# Patient Record
Sex: MALE | Race: WHITE | NOT HISPANIC OR LATINO | Employment: UNEMPLOYED | URBAN - METROPOLITAN AREA
[De-identification: names, ages, dates, MRNs, and addresses within clinical notes are randomized per-mention and may not be internally consistent; named-entity substitution may affect disease eponyms.]

---

## 2017-10-02 ENCOUNTER — OFFICE VISIT (OUTPATIENT)
Dept: OPHTHALMOLOGY | Facility: CLINIC | Age: 22
End: 2017-10-02
Attending: OPHTHALMOLOGY
Payer: COMMERCIAL

## 2017-10-02 DIAGNOSIS — H52.03 HYPEROPIA OF BOTH EYES WITH ASTIGMATISM: ICD-10-CM

## 2017-10-02 DIAGNOSIS — H50.52 EXOPHORIA: Primary | ICD-10-CM

## 2017-10-02 DIAGNOSIS — H52.203 HYPEROPIA OF BOTH EYES WITH ASTIGMATISM: ICD-10-CM

## 2017-10-02 DIAGNOSIS — Q12.0 CONGENITAL CATARACT OF BOTH EYES: ICD-10-CM

## 2017-10-02 PROCEDURE — 99213 OFFICE O/P EST LOW 20 MIN: CPT | Mod: ZF

## 2017-10-02 PROCEDURE — 92015 DETERMINE REFRACTIVE STATE: CPT | Mod: GY,ZF

## 2017-10-02 ASSESSMENT — CUP TO DISC RATIO
OS_RATIO: 0.15
OD_RATIO: 0.15

## 2017-10-02 ASSESSMENT — VISUAL ACUITY
OS_SC+: +2
OS_SC: 20/40
CORRECTION_TYPE: GLASSES
OD_SC+: +3
METHOD: SNELLEN - LINEAR
OD_SC: 20/40

## 2017-10-02 ASSESSMENT — TONOMETRY
OD_IOP_MMHG: 16
IOP_METHOD: ICARE - SINGLE/KS
OS_IOP_MMHG: 14

## 2017-10-02 ASSESSMENT — SLIT LAMP EXAM - LIDS
COMMENTS: NORMAL
COMMENTS: NORMAL

## 2017-10-02 ASSESSMENT — REFRACTION_WEARINGRX
OD_CYLINDER: +0.75
OS_CYLINDER: +0.75
OD_AXIS: 010
OS_SPHERE: +5.50
OD_SPHERE: +5.50
OS_AXIS: 140

## 2017-10-02 ASSESSMENT — REFRACTION
OD_SPHERE: +5.50
OD_CYLINDER: +0.50
OS_AXIS: 140
OS_CYLINDER: +0.75
OS_SPHERE: +5.50
OD_AXIS: 010

## 2017-10-02 ASSESSMENT — CONF VISUAL FIELD
OD_NORMAL: 1
OS_NORMAL: 1
METHOD: TOYS

## 2017-10-02 ASSESSMENT — EXTERNAL EXAM - RIGHT EYE: OD_EXAM: NORMAL

## 2017-10-02 ASSESSMENT — EXTERNAL EXAM - LEFT EYE: OS_EXAM: NORMAL

## 2017-10-02 NOTE — MR AVS SNAPSHOT
After Visit Summary   10/2/2017    Carter Guaman    MRN: [id number]           Patient Information     Date Of Birth          1995        Visit Information        Provider Department      10/2/2017 10:10 AM Gabe Weiss MD [facility] Peds Eye General        Today's Diagnoses     Exophoria    -  1    Congenital cataract of both eyes        Hyperopia of both eyes with astigmatism          Care Instructions    To schedule an appointment in 1-2 years for your adult optometry appointment with Dr. Trinh or any of the adult optometrists: call the adult eye clinic  at [phone].           Follow-ups after your visit        Follow-up notes from your care team     Return in about 18 months (around 2019) for Adult Optometry.      Who to contact     Please call your clinic at [phone] to:    Ask questions about your health    Make or cancel appointments    Discuss your medicines    Learn about your test results    Speak to your doctor   If you have compliments or concerns about an experience at your clinic, or if you wish to file a complaint, please contact [facility] Patient Relations at [phone] or email us at [email or web address]         Additional Information About Your Visit        MyChart Information     Onset Technology[employer] is an electronic gateway that provides easy, online access to your medical records. With Nebula, you can request a clinic appointment, read your test results, renew a prescription or communicate with your care team.     To sign up for Sosedit visit the website at [email or web address]   You will be asked to enter the access code listed below, as well as some personal information. Please follow the directions to create your username and password.     Your access code is: [email or web address]  Expires: 2017 11:43 AM     Your access code will  in 90 days. If you need help or a new code, please contact your [facility]  [place] [facility] or call [phone] for assistance.        Care EveryWhere ID     This is your Care EveryWhere ID. This could be used by other organizations to access your [place] medical records  [id number]         Blood Pressure from Last 3 Encounters:   03/09/15 117/70   02/13/15 125/67    Weight from Last 3 Encounters:   03/09/15 60 kg (132 lb 3.2 oz) (14 %)*   02/13/15 60.6 kg (133 lb 11.2 oz) (17 %)*     * Growth percentiles are based on [facility] 2-20 Years data.              Today, you had the following     No orders found for display       Primary Care Provider Office Phone # Fax #    Ketty Gomez [phone]       [facility] [address] [address]        Equal Access to Services     AUTUMN [facility] : Hadii aad ku hadasho Soomaali, waaxda luqadaha, qaybta kaalmada adeegyada, waxay celiain hayaan trav sahu . So [facility] [phone].    ATENCIÓN: Si habla español, tiene a ramirez disposición servicios gratuitos de asistencia lingüística. Hannah al [phone].    We comply with applicable federal civil rights laws and [place] laws. We do not discriminate on the basis of race, color, national origin, age, disability, sex, sexual orientation, or gender identity.            Thank you!     Thank you for choosing [facility]  for your care. Our goal is always to provide you with excellent care. Hearing back from our patients is one way we can continue to improve our services. Please take a few minutes to complete the written survey that you may receive in the mail after your visit with us. Thank you!             Your Updated Medication List - Protect others around you: Learn how to safely use, store and throw away your medicines at www.disposemymeds.org.          This list is accurate as of: 10/2/17 11:44 AM.  Always use your most recent med list.                   Brand Name Dispense Instructions for use Diagnosis    CLONIDINE HCL PO            GNP FLAXSEED PO           MIRALAX powder   Generic drug:  polyethylene glycol      Take 1 capful by mouth daily        OMEPRAZOLE PO      Take 40 mg by mouth daily Opened into food        SERTRALINE HCL PO      Take 100 mg by mouth daily

## 2017-10-02 NOTE — PROGRESS NOTES
Chief Complaints and History of Present Illnesses   Patient presents with     Down's Syndrome     No new VA concerns d/n, wears glasses full time. No strabismus. No redness/irritation/tearing.   Review of systems for the eyes was negative other than the pertinent positives and negatives noted in the HPI.  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 Observe.     Down syndrome  No evidence of Keratoconus, glaucoma, or visually significant cataract    Anomalous optic nerve appearance, both eyes  Stable mild gliosis of bilateral optic nerves, right eye > left eye, slightly crowded vascularity  Nerve appearance seems stable compared to prior photos    Hypermetropia, bilateral  Results of refraction discussed, provided updated Rx for glasses    Hypotonia  Related to Down syndrome        Return in about 18 months (around 4/2/2019) for Adult Optometry. Can return to our [facility] eye clinic as needed if exams are difficult but Carter cooperates well and has the rest of his care on the adult side so I graduated him to adult eye exams today.    Patient Instructions   To schedule an appointment in 1-2 years for your adult optometry appointment with Dr. Trinh or any of the adult optometrists: call the adult eye clinic  at [phone].       Visit Diagnoses & Orders    ICD-10-CM    1. Exophoria H50.52    2. Congenital cataract of both eyes Q12.0    3. Hyperopia of both eyes with astigmatism H52.03     H52.203       Attending Physician Attestation:  Complete documentation of historical and exam elements from today's encounter can be found in the full encounter summary report (not reduplicated in this progress note).  I personally obtained  the chief complaint(s) and history of present illness.  I confirmed and edited as necessary the review of systems, past medical/surgical history, family history, social history, and examination findings as documented by others; and I examined the patient myself.  I personally reviewed the relevant tests, images, and reports as documented above.  I formulated and edited as necessary the assessment and plan and discussed the findings and management plan with the patient and family. - Gabe Weiss Jr., MD

## 2017-10-02 NOTE — PATIENT INSTRUCTIONS
To schedule an appointment in 1-2 years for your adult optometry appointment with Dr. Trinh or any of the adult optometrists: call the adult eye clinic  at [phone].

## 2017-10-02 NOTE — NURSING NOTE
Chief Complaint   Patient presents with     Down's Syndrome     No new VA concerns d/n, wears glasses full time. No strabismus. No redness/irritation/tearing.     HPI    Symptoms:           Do you have eye pain now?:  No

## 2022-12-06 ENCOUNTER — OFFICE VISIT (OUTPATIENT)
Dept: OPTOMETRY | Facility: CLINIC | Age: 27
End: 2022-12-06
Payer: COMMERCIAL

## 2022-12-06 DIAGNOSIS — H52.223 REGULAR ASTIGMATISM OF BOTH EYES: ICD-10-CM

## 2022-12-06 DIAGNOSIS — H57.9 ITCHY EYES: ICD-10-CM

## 2022-12-06 DIAGNOSIS — Z01.01 ENCOUNTER FOR EXAMINATION OF EYES AND VISION WITH ABNORMAL FINDINGS: Primary | ICD-10-CM

## 2022-12-06 DIAGNOSIS — H52.03 HYPERMETROPIA, BILATERAL: ICD-10-CM

## 2022-12-06 DIAGNOSIS — Q12.0 CONGENITAL CATARACT OF BOTH EYES: ICD-10-CM

## 2022-12-06 PROCEDURE — 92004 COMPRE OPH EXAM NEW PT 1/>: CPT | Performed by: OPTOMETRIST

## 2022-12-06 PROCEDURE — 92015 DETERMINE REFRACTIVE STATE: CPT | Performed by: OPTOMETRIST

## 2022-12-06 RX ORDER — CLONIDINE HYDROCHLORIDE 0.2 MG/1
TABLET ORAL
COMMUNITY
Start: 2022-09-20

## 2022-12-06 ASSESSMENT — REFRACTION_MANIFEST
OD_SPHERE: +6.00
OS_SPHERE: +5.75
OS_AXIS: 140
OD_AXIS: 011
OD_CYLINDER: +0.50
OS_CYLINDER: +0.75

## 2022-12-06 ASSESSMENT — CONF VISUAL FIELD
OS_SUPERIOR_TEMPORAL_RESTRICTION: 0
OS_SUPERIOR_NASAL_RESTRICTION: 0
OD_INFERIOR_TEMPORAL_RESTRICTION: 0
OS_INFERIOR_NASAL_RESTRICTION: 0
OS_NORMAL: 1
OD_NORMAL: 1
OD_SUPERIOR_NASAL_RESTRICTION: 0
OS_INFERIOR_TEMPORAL_RESTRICTION: 0
OD_INFERIOR_NASAL_RESTRICTION: 0
OD_SUPERIOR_TEMPORAL_RESTRICTION: 0
METHOD: COUNTING FINGERS

## 2022-12-06 ASSESSMENT — REFRACTION_WEARINGRX
OD_SPHERE: +5.50
OS_AXIS: 140
OD_AXIS: 010
OS_CYLINDER: +0.75
OD_CYLINDER: +0.50
OS_SPHERE: +5.50
SPECS_TYPE: SVL

## 2022-12-06 ASSESSMENT — EXTERNAL EXAM - LEFT EYE: OS_EXAM: NORMAL

## 2022-12-06 ASSESSMENT — CUP TO DISC RATIO
OD_RATIO: 0.15
OS_RATIO: 0.15

## 2022-12-06 ASSESSMENT — VISUAL ACUITY
OD_CC+: -1
OS_CC+: -1
OS_CC: 20/40-2
OD_CC: 20/40-2
METHOD: SNELLEN - LINEAR
CORRECTION_TYPE: GLASSES
OD_CC: 20/70
OS_CC: 20/60

## 2022-12-06 ASSESSMENT — TONOMETRY
OS_IOP_MMHG: NTT
IOP_METHOD: BOTH EYES NORMAL BY PALPATION
OD_IOP_MMHG: NTT

## 2022-12-06 ASSESSMENT — SLIT LAMP EXAM - LIDS
COMMENTS: NORMAL
COMMENTS: NORMAL

## 2022-12-06 ASSESSMENT — EXTERNAL EXAM - RIGHT EYE: OD_EXAM: NORMAL

## 2022-12-06 NOTE — PATIENT INSTRUCTIONS
Begin ketotifen fumarate eyedrops twice daily as needed for itching of eyes.     Updated glasses prescription provided today. Optional to fill.     Return in 1 year for a comprehensive eye exam, or sooner if needed.      The effects of the dilating drops last for 4- 6 hours.  You will be more sensitive to light and vision will be blurry up close.  Mydriatic sunglasses were given if needed.     Eliecer Laws, JESI  [address]  [place]  [address] [phone]

## 2022-12-06 NOTE — PROGRESS NOTES
Chief Complaint   Patient presents with     Annual Eye Exam      Accompanied by mother, Karol    -Has been having some issues with eye rubbing - for the past 4-6 weeks or so, mom reports that he has been rubbing his eyes and around his nose almost constantly. Really irritating, itchy and red. Symptoms definitely worsen toward end of day, his eyes are bloodshot and swollen from rubbing. Has seen ENT and PCP and they think it could be a long covid symptom (Had covid 1/2022). Mom thinks it is related to his sinuses - when the symptoms began, it was much more nose related. Has been using Flonase and Saline rinse PRN. Has tried warm compresses which he tolerates but they don't seem to help. Has not tried any AT's     Last Eye Exam: 10/2/2017   Dilated Previously: Yes, side effects of dilation explained today - has some trouble with drops     What are you currently using to see?  Glasses - wears full time        Distance Vision Acuity: Satisfied with vision    Near Vision Acuity: Satisfied with vision while reading and using computer with glasses    Eye Comfort: sore and itchy - see above notes  Do you use eye drops? : No  Occupation or Hobbies: Work program [email or web address], [email or web address], Watching TV    Radha Ho          Medical, surgical and family histories reviewed and updated 12/6/2022.       OBJECTIVE: See Ophthalmology exam    ASSESSMENT:    ICD-10-CM    1. Encounter for examination of eyes and vision with abnormal findings  Z01.01       2. Congenital cataract of both eyes  Q12.0       3. Itchy eyes  R68.89       4. Hypermetropia, bilateral  H52.03       5. Regular astigmatism of both eyes  H52.223           PLAN:     Patient Instructions   Begin ketotifen fumarate eyedrops twice daily as needed for itching of eyes.     Updated glasses prescription provided today. Optional to fill.     Return in 1 year for a comprehensive eye exam, or sooner if needed.      The effects of the dilating drops last for 4- 6 hours.  You  will be more sensitive to light and vision will be blurry up close.  Mydriatic sunglasses were given if needed.     Eliecer Laws, [address]  [place]  [address] [phone]

## 2022-12-06 NOTE — LETTER
12/6/2022         RE: Carter Guaman  [address] [place] [address]        Dear Colleague,    Thank you for referring your patient, Carter Guaman, to the [facility]. Please see a copy of my visit note below.    Chief Complaint   Patient presents with     Annual Eye Exam      Accompanied by mother, Karol    -Has been having some issues with eye rubbing - for the past 4-6 weeks or so, mom reports that he has been rubbing his eyes and around his nose almost constantly. Really irritating, itchy and red. Symptoms definitely worsen toward end of day, his eyes are bloodshot and swollen from rubbing. Has seen ENT and PCP and they think it could be a long covid symptom (Had covid 1/2022). Mom thinks it is related to his sinuses - when the symptoms began, it was much more nose related. Has been using Flonase and Saline rinse PRN. Has tried warm compresses which he tolerates but they don't seem to help. Has not tried any AT's     Last Eye Exam: 10/2/2017   Dilated Previously: Yes, side effects of dilation explained today - has some trouble with drops     What are you currently using to see?  Glasses - wears full time        Distance Vision Acuity: Satisfied with vision    Near Vision Acuity: Satisfied with vision while reading and using computer with glasses    Eye Comfort: sore and itchy - see above notes  Do you use eye drops? : No  Occupation or Hobbies: Work program [employer], [employer], Watching TV    [facility]          Medical, surgical and family histories reviewed and updated 12/6/2022.       OBJECTIVE: See Ophthalmology exam    ASSESSMENT:    ICD-10-CM    1. Encounter for examination of eyes and vision with abnormal findings  Z01.01       2. Congenital cataract of both eyes  Q12.0       3. Itchy eyes  R68.89       4. Hypermetropia, bilateral  H52.03       5. Regular astigmatism of both eyes  H52.223           PLAN:     Patient Instructions   Begin ketotifen fumarate  eyedrops twice daily as needed for itching of eyes.     Updated glasses prescription provided today. Optional to fill.     Return in 1 year for a comprehensive eye exam, or sooner if needed.      The effects of the dilating drops last for 4- 6 hours.  You will be more sensitive to light and vision will be blurry up close.  Mydriatic sunglasses were given if needed.     Eliecer Laws, JESI  [address] [place]  [address]    [phone]            Again, thank you for allowing me to participate in the care of your patient.        Sincerely,        Eilecer Laws OD

## 2023-06-27 ENCOUNTER — TRANSFERRED RECORDS (OUTPATIENT)
Dept: HEALTH INFORMATION MANAGEMENT | Facility: CLINIC | Age: 28
End: 2023-06-27
Payer: COMMERCIAL

## 2023-06-28 ENCOUNTER — TRANSFERRED RECORDS (OUTPATIENT)
Dept: HEALTH INFORMATION MANAGEMENT | Facility: CLINIC | Age: 28
End: 2023-06-28
Payer: COMMERCIAL

## 2023-07-17 ENCOUNTER — MEDICAL CORRESPONDENCE (OUTPATIENT)
Dept: HEALTH INFORMATION MANAGEMENT | Facility: CLINIC | Age: 28
End: 2023-07-17
Payer: COMMERCIAL

## 2023-07-18 ENCOUNTER — TELEPHONE (OUTPATIENT)
Dept: NEUROSURGERY | Facility: CLINIC | Age: 28
End: 2023-07-18
Payer: COMMERCIAL

## 2023-07-18 ENCOUNTER — TRANSCRIBE ORDERS (OUTPATIENT)
Dept: OTHER | Age: 28
End: 2023-07-18

## 2023-07-18 DIAGNOSIS — G51.8 PAIN DUE TO NEUROPATHY OF FACIAL NERVE: ICD-10-CM

## 2023-07-18 DIAGNOSIS — G50.0 TRIGEMINAL NEURALGIA: Primary | ICD-10-CM

## 2023-07-18 NOTE — TELEPHONE ENCOUNTER
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

## 2023-07-19 NOTE — TELEPHONE ENCOUNTER
Records Requested     July 19, 2023 4:21 PM   [id number]   Facility  [facility]   Outcome 4:25pm Sent request for imaging to be pushed to PACS. -ALBERTA Rodrigez on 7/20/2023 at 8:14 AM Imaging resolved into PACS.-ALBERTA     Records Requested     July 19, 2023 4:21 PM   [id number]   Facility  [facility]   Outcome 4:26pm Sent request for imaging to be pushed to PACS. -ALBERTA Rodrigez on 7/20/2023 at 8:14 AM Imaging resolved into PACS. -ALBERTA     RECORDS RECEIVED FROM: Care Everywhere    REASON FOR VISIT: Trigeminal Neuralgia    Date of Appt: 7/21/23 9:00am    NOTES (FOR ALL VISITS) STATUS DETAILS   OFFICE NOTE from referring provider Care Everywhere 7/18/23 (Transcribed Orders), 6/27/23 Annie Waldron APRN CNP @ [facility]     DISCHARGE REPORT from the ER Care Everywhere 5/23/23 Dee Dee Farias MD @ [facility] ED     OPERATIVE REPORT Care Everywhere 4/4/23 Malvin Lugo MD @ [facility]  (STEALTH) RIGHT MAXILLARY ANTROSTOMY AND LEFT SPHENOIDOTOMY WITH STEALTH GUIDANCE     MEDICATION LIST Internal    IMAGING  (FOR ALL VISITS)     MRI (HEAD, NECK, SPINE) PACS   6/28/23 MR Brain   CT (HEAD, NECK, SPINE) PACS NM  5/23/23 CT Head  1/7/23 CT Sinus  1/7/23  CT Head

## 2023-07-21 ENCOUNTER — LAB (OUTPATIENT)
Dept: LAB | Facility: CLINIC | Age: 28
End: 2023-07-21
Payer: COMMERCIAL

## 2023-07-21 ENCOUNTER — OFFICE VISIT (OUTPATIENT)
Dept: NEUROSURGERY | Facility: CLINIC | Age: 28
End: 2023-07-21
Payer: COMMERCIAL

## 2023-07-21 ENCOUNTER — PRE VISIT (OUTPATIENT)
Dept: NEUROSURGERY | Facility: CLINIC | Age: 28
End: 2023-07-21

## 2023-07-21 VITALS
SYSTOLIC BLOOD PRESSURE: 115 MMHG | HEART RATE: 80 BPM | RESPIRATION RATE: 16 BRPM | OXYGEN SATURATION: 98 % | DIASTOLIC BLOOD PRESSURE: 68 MMHG

## 2023-07-21 DIAGNOSIS — R51.9 FACIAL PAIN: ICD-10-CM

## 2023-07-21 DIAGNOSIS — R51.9 FACIAL PAIN: Primary | ICD-10-CM

## 2023-07-21 LAB
ANION GAP SERPL CALCULATED.3IONS-SCNC: 7 MMOL/L (ref 7–15)
BASOPHILS # BLD AUTO: 0.1 10E3/UL (ref 0–0.2)
BASOPHILS NFR BLD AUTO: 1 %
BUN SERPL-MCNC: 22 MG/DL (ref 6–20)
CALCIUM SERPL-MCNC: 9.6 MG/DL (ref 8.6–10)
CHLORIDE SERPL-SCNC: 106 MMOL/L (ref 98–107)
CREAT SERPL-MCNC: 1.23 MG/DL (ref 0.67–1.17)
DEPRECATED HCO3 PLAS-SCNC: 30 MMOL/L (ref 22–29)
EOSINOPHIL # BLD AUTO: 0 10E3/UL (ref 0–0.7)
EOSINOPHIL NFR BLD AUTO: 1 %
ERYTHROCYTE [DISTWIDTH] IN BLOOD BY AUTOMATED COUNT: 13.1 % (ref 10–15)
GFR SERPL CREATININE-BSD FRML MDRD: 82 ML/MIN/1.73M2
GLUCOSE SERPL-MCNC: 58 MG/DL (ref 70–99)
HCT VFR BLD AUTO: 49.3 % (ref 40–53)
HGB BLD-MCNC: 16.2 G/DL (ref 13.3–17.7)
IMM GRANULOCYTES # BLD: 0 10E3/UL
IMM GRANULOCYTES NFR BLD: 0 %
LYMPHOCYTES # BLD AUTO: 0.8 10E3/UL (ref 0.8–5.3)
LYMPHOCYTES NFR BLD AUTO: 18 %
MCH RBC QN AUTO: 31.8 PG (ref 26.5–33)
MCHC RBC AUTO-ENTMCNC: 32.9 G/DL (ref 31.5–36.5)
MCV RBC AUTO: 97 FL (ref 78–100)
MONOCYTES # BLD AUTO: 0.3 10E3/UL (ref 0–1.3)
MONOCYTES NFR BLD AUTO: 6 %
NEUTROPHILS # BLD AUTO: 3.3 10E3/UL (ref 1.6–8.3)
NEUTROPHILS NFR BLD AUTO: 74 %
NRBC # BLD AUTO: 0 10E3/UL
NRBC BLD AUTO-RTO: 0 /100
PLATELET # BLD AUTO: 213 10E3/UL (ref 150–450)
POTASSIUM SERPL-SCNC: 4.7 MMOL/L (ref 3.4–5.3)
RBC # BLD AUTO: 5.09 10E6/UL (ref 4.4–5.9)
SODIUM SERPL-SCNC: 143 MMOL/L (ref 136–145)
WBC # BLD AUTO: 4.4 10E3/UL (ref 4–11)

## 2023-07-21 PROCEDURE — 36415 COLL VENOUS BLD VENIPUNCTURE: CPT | Performed by: PATHOLOGY

## 2023-07-21 PROCEDURE — 80048 BASIC METABOLIC PNL TOTAL CA: CPT | Performed by: PATHOLOGY

## 2023-07-21 PROCEDURE — 99204 OFFICE O/P NEW MOD 45 MIN: CPT | Performed by: NURSE PRACTITIONER

## 2023-07-21 PROCEDURE — 85025 COMPLETE CBC W/AUTO DIFF WBC: CPT | Performed by: PATHOLOGY

## 2023-07-21 RX ORDER — OXCARBAZEPINE 60 MG/ML
60 SUSPENSION ORAL 2 TIMES DAILY
Qty: 100 ML | Refills: 1 | Status: SHIPPED | OUTPATIENT
Start: 2023-07-21 | End: 2023-08-14

## 2023-07-21 ASSESSMENT — PATIENT HEALTH QUESTIONNAIRE - PHQ9: SUM OF ALL RESPONSES TO PHQ QUESTIONS 1-9: 8

## 2023-07-21 NOTE — NURSING NOTE
Chief Complaint   Patient presents with     New Patient     New patient, confirmed with patient.     Minda Navarro

## 2023-07-21 NOTE — PATIENT INSTRUCTIONS
We discussed Oxcarbazepine/ Carbamazepine as medication options. Will call you after I talk to the pharmacist.     Labs today.

## 2023-07-21 NOTE — PROGRESS NOTES
[facility]  Department of Neurosurgery      Name: Carter Guaman  MRN: [id number]  Age: 28 year old  : 1995  Referring provider: Annie Waldron  2023      Chief Complaint:   Right Facial pain  New patient    History of Present Illness: Carter Guaman is a 28 year old male with a history of Down syndrome who is here today for right sided facial pain. He is accompanied by his parents. History provided by parents. In 2022, patient had covid. Per parents,  Since this infection he started to rub the right side of his nose frequently and appeared uncomfortable. He also had frequent sinus infections and subsequently underwent sinus surgery (Right maxillary antrostomy/right uncinectomy with tissue removal and Left sphenoidotomy with tissue removal) in 2023.     Patient continued to report discomfort and was evaluated by a Neurology provider. Trigeminal neuralgia was brought up as a possible diagnosis. Patient was started on Gabapentin and later on Lamotrigine. Patient had blisters/ rash from these medications.     Today, parents report that patient continues to show abnormal behaviors such as rubbing on the right side of his nose, holding his forehead, pounding on his thighs as ways to express pain. He has been eating and drinking normally. Parents have been medicating him with Tylenol and Ibuprofen, but doesn't seem to help with his symptoms.     He had a right sided nerve block at [facility]. Per mom, due to the right sided facial numbness and discomfort post nerve block, patient started to bite his cheeks and had bad injury to his cheeks. This took many weeks to completely heal.      Patient takes clonidine, medical cannabis, and Zoloft.       Review of Systems:   Pertinent items are noted in HPI or as in patient entered ROS below, remainder of complete ROS is negative.        No data to display                 Active Medications:     Current Outpatient Medications:     cloNIDine  (CATAPRES) 0.2 MG tablet, , Disp: , Rfl:     CLONIDINE HCL PO, , Disp: , Rfl:     sertraline (ZOLOFT) 50 MG tablet, , Disp: , Rfl:     ketotifen (ZADITOR) 0.025 % ophthalmic solution, Place 1 drop into both eyes 2 times daily as needed for itching (Patient not taking: Reported on 7/21/2023), Disp: 5 mL, Rfl: 11    SERTRALINE HCL PO, Take 100 mg by mouth daily (Patient not taking: Reported on 7/21/2023), Disp: , Rfl:       Allergies:   Patient has no known allergies.      Past Medical History:  Past Medical History:   Diagnosis Date    Down syndrome     in transitions program; doing well, in vitor achievement, does sanding and painting of retail boards, liviing skills    Hyperopia     wears glasses FT; father notes no ET, no diplopia, no headaches/eyestrain    Hypotonia     no problems with joints, no braces; no pain     Patient Active Problem List   Diagnosis    Exophoria    Hypotonia    Down syndrome    Hypermetropia    Lens opacities    Food aversion    Scoliosis    Depression    Constipation        Past Surgical History:  Past Surgical History:   Procedure Laterality Date    DENTAL SURGERY      PE TUBES      TONSILLECTOMY         Family History:   Family History   Problem Relation Age of Onset    Cancer Maternal Grandmother     Diabetes Other         Maternal uncle    Amblyopia No family hx of     Strabismus No family hx of     Glaucoma No family hx of     Hypertension No family hx of     Macular Degeneration No family hx of          Social History:   Social History     Tobacco Use    Smoking status: Never   Substance Use Topics    Alcohol use: No        Physical Exam:   General: No acute distress.   Neuro: Patient is awake.  Verbal capacity is limited due to Down syndrome.  Expresses discomfort and pain by intermittently rubbing on the right side of his nose/ face and holding onto his right forehead. Gait is normal with normal heel-toe walking.   Psych: Normal mood and affect. Behavior is normal.       Imagin2023 MRI Brain:  1. No acute intracranial abnormality evident.   2. No intracranial mass or mass effect. No abnormal intracranial enhancement. No focal parenchymal signal abnormality. No hydrocephalus.   3. No abnormal enhancement along the trigeminal nerves. No abnormality in the expected location of the trigeminal nuclei. Vascular loop contacting the medial surface of the cisternal segment of the right trigeminal nerve.        Assessment:  Right Facial pain  New patient    Plan:  Today we discussed trigeminal neuralgia, medical and surgical management.  This is usually a clinical diagnosis based on patient's symptoms.  Main challenge is that patient has limited verbal capacity secondary to Down syndrome.  He is demonstrating discomfort/pain by nonverbal cues.  He had allergy (blisters/rash) to gabapentin and lamotrigine.  Discussed a trial of oxcarbazepine.  Patient tolerates liquid medication better. There is an increased risk of rash with this medication.  Discussed with the pharmacist who recommended to start the patient on 60 mg twice daily.  Prescription sent to pharmacy.  He is on Zoloft which increases the risk for serotonin syndrome.  Recommended to reduce the dose after consulting with his primary care physician.    His MRI brain will be reviewed by our team and patient's mother will be contacted with additional recommendations.    Addendum on 2023: Imaging was reviewed by Dr. Umana.  MRI of the brain does not show any major vascular compression on the right side.  Patient is not a candidate for MVD.  Due to the prior history of trigeminal trophic syndrome after nerve block patient is not a candidate for surgical ablation.  We could consider Cymbalta trial and referral to facial pain clinic.         Cyndi Borges CNP  Department of Neurosurgery    I spent 45 minutes on patient care activities related to this encounter on the date of service, including time spent reviewing the  chart, obtaining history and examination and in counseling the patient, and in documentation in the electronic medical record.

## 2023-07-21 NOTE — LETTER
2023       RE: Carter Guaman  [address] [place] [address]     Dear Colleague,    Thank you for referring your patient, Carter Guaman, to the [facility] NEUROSURGERY CLINIC [place] at [facility]. Please see a copy of my visit note below.    [facility]  Department of Neurosurgery      Name: Carter Guaman  MRN: [id number]  Age: 28 year old  : 1995  Referring provider: Annie Waldron  2023      Chief Complaint:   Right Facial pain  New patient    History of Present Illness: Carter Guaman is a 28 year old male with a history of Down syndrome who is here today for right sided facial pain. He is accompanied by his parents. History provided by parents. In 2022, patient had covid. Per parents,  Since this infection he started to rub the right side of his nose frequently and appeared uncomfortable. He also had frequent sinus infections and subsequently underwent sinus surgery (Right maxillary antrostomy/right uncinectomy with tissue removal and Left sphenoidotomy with tissue removal) in 2023.     Patient continued to report discomfort and was evaluated by a Neurology provider. Trigeminal neuralgia was brought up as a possible diagnosis. Patient was started on Gabapentin and later on Lamotrigine. Patient had blisters/ rash from these medications.     Today, parents report that patient continues to show abnormal behaviors such as rubbing on the right side of his nose, holding his forehead, pounding on his thighs as ways to express pain. He has been eating and drinking normally. Parents have been medicating him with Tylenol and Ibuprofen, but doesn't seem to help with his symptoms.     He had a right sided nerve block at [facility] pain clinic. Per mom, due to the right sided facial numbness and discomfort post nerve block, patient started to bite his cheeks and had bad injury to his cheeks. This took many weeks to  completely heal.      Patient takes clonidine, medical cannabis, and Zoloft.       Review of Systems:   Pertinent items are noted in HPI or as in patient entered ROS below, remainder of complete ROS is negative.        No data to display                 Active Medications:     Current Outpatient Medications:     cloNIDine (CATAPRES) 0.2 MG tablet, , Disp: , Rfl:     CLONIDINE HCL PO, , Disp: , Rfl:     sertraline (ZOLOFT) 50 MG tablet, , Disp: , Rfl:     ketotifen (ZADITOR) 0.025 % ophthalmic solution, Place 1 drop into both eyes 2 times daily as needed for itching (Patient not taking: Reported on 7/21/2023), Disp: 5 mL, Rfl: 11    SERTRALINE HCL PO, Take 100 mg by mouth daily (Patient not taking: Reported on 7/21/2023), Disp: , Rfl:       Allergies:   Patient has no known allergies.      Past Medical History:  Past Medical History:   Diagnosis Date    Down syndrome     in transitions program; doing well, in vitor achievement, does sanding and painting of retail boards, liviing skills    Hyperopia     wears glasses FT; father notes no ET, no diplopia, no headaches/eyestrain    Hypotonia     no problems with joints, no braces; no pain     Patient Active Problem List   Diagnosis    Exophoria    Hypotonia    Down syndrome    Hypermetropia    Lens opacities    Food aversion    Scoliosis    Depression    Constipation        Past Surgical History:  Past Surgical History:   Procedure Laterality Date    DENTAL SURGERY      PE TUBES      TONSILLECTOMY         Family History:   Family History   Problem Relation Age of Onset    Cancer Maternal Grandmother     Diabetes Other         Maternal uncle    Amblyopia No family hx of     Strabismus No family hx of     Glaucoma No family hx of     Hypertension No family hx of     Macular Degeneration No family hx of          Social History:   Social History     Tobacco Use    Smoking status: Never   Substance Use Topics    Alcohol use: No        Physical Exam:   General: No acute  distress.   Neuro: Patient is awake.  Verbal capacity is limited due to Down syndrome.  Expresses discomfort and pain by intermittently rubbing on the right side of his nose/ face and holding onto his right forehead. Gait is normal with normal heel-toe walking.   Psych: Normal mood and affect. Behavior is normal.      Imagin2023 MRI Brain:  1. No acute intracranial abnormality evident.   2. No intracranial mass or mass effect. No abnormal intracranial enhancement. No focal parenchymal signal abnormality. No hydrocephalus.   3. No abnormal enhancement along the trigeminal nerves. No abnormality in the expected location of the trigeminal nuclei. Vascular loop contacting the medial surface of the cisternal segment of the right trigeminal nerve.        Assessment:  Right Facial pain  New patient    Plan:  Today we discussed trigeminal neuralgia, medical and surgical management.  This is usually a clinical diagnosis based on patient's symptoms.  Main challenge is that patient has limited verbal capacity secondary to Down syndrome.  He is demonstrating discomfort/pain by nonverbal cues.  He had allergy (blisters/rash) to gabapentin and lamotrigine.  Discussed a trial of oxcarbazepine.  Patient tolerates liquid medication better. There is an increased risk of rash with this medication.  Discussed with the pharmacist who recommended to start the patient on 60 mg twice daily.  Prescription sent to pharmacy.  He is on Zoloft which increases the risk for serotonin syndrome.  Recommended to reduce the dose after consulting with his primary care physician.    His MRI brain will be reviewed by our team and patient's mother will be contacted with additional recommendations.    I spent 45 minutes on patient care activities related to this encounter on the date of service, including time spent reviewing the chart, obtaining history and examination and in counseling the patient, and in documentation in the electronic medical  record.          Again, thank you for allowing me to participate in the care of your patient.      Sincerely,    TELLY Emery CNP

## 2023-07-24 ENCOUNTER — TELEPHONE (OUTPATIENT)
Dept: NEUROSURGERY | Facility: CLINIC | Age: 28
End: 2023-07-24
Payer: COMMERCIAL

## 2023-07-24 NOTE — TELEPHONE ENCOUNTER
Spoke with patient's mom. They started Oxcarbazepine 60 mg daily on Friday and is up to 60 mg twice daily for the past few days. No noticeable improvement in symptoms yet. I recommended to gradually increase the dose up to 5 ml (300 mg) twice daily.    We will review his brain MRI and will cob=ntact them with additional recommendations.

## 2023-07-24 NOTE — TELEPHONE ENCOUNTER
Voice mail received asking if medication trial started after OV with Cyndi Borges NP for facial pain should be continued.    OV 7/21/23.  ? Trigeminal Neuralgia, patient started on  Liquid Oxcarbazepine 60mg twice daily.    Left message for Mother to return call to Nupur OTERO @ [phone], normally at least 2 week trial to see if any facial  Pain relief. Patient Down's syndrome and verbal  Communication is difficult.

## 2023-07-27 ENCOUNTER — TELEPHONE (OUTPATIENT)
Dept: NEUROSURGERY | Facility: CLINIC | Age: 28
End: 2023-07-27
Payer: COMMERCIAL

## 2023-07-27 NOTE — TELEPHONE ENCOUNTER
"Dad calling with Carter symptoms.   Carter started Liquid Oxcarbazepime last Friday 7/21/23, now taking 2ml twice daily.  Carter complaining of really bad headache.  Patient keeps saying \"my head, my head\", wont put on head phones because head hurting.  Headache started today.   No difference in facial pain, in eyes and nose area, no improvement in facial pain.  Dad states will stop the medication, to see if headache goes away in 24 -48 hours.  If the headache goes away, it could be from the medication.  If headache does not go away it may be from something else.  Patient eating drinking as normal, no other issues.    Voices understanding  "

## 2023-07-31 ENCOUNTER — TELEPHONE (OUTPATIENT)
Dept: NEUROSURGERY | Facility: CLINIC | Age: 28
End: 2023-07-31
Payer: COMMERCIAL

## 2023-07-31 NOTE — TELEPHONE ENCOUNTER
----- Message from TELLY Emery CNP sent at 7/28/2023  4:12 PM CDT -----  Hi,    If his parents call won, we can recommend Cymbalta trial per AG. Then facial Pain clinic.     No clear neurovascular compression on MRI.     Thanks,  Cyndi

## 2023-08-14 DIAGNOSIS — R51.9 FACIAL PAIN: Primary | ICD-10-CM

## 2023-08-14 RX ORDER — OXCARBAZEPINE 60 MG/ML
300 SUSPENSION ORAL 2 TIMES DAILY
Qty: 300 ML | Refills: 0 | Status: SHIPPED | OUTPATIENT
Start: 2023-08-14 | End: 2023-09-14

## 2023-08-14 NOTE — TELEPHONE ENCOUNTER
Carter now taking Oxcarbazepime 5ml twice daily for facial rubbing and pain.  Mom does see some improvement in number of good days, some improvement with talking more often and some + behavior change.  Patient does still have some bad days, he is taking Ibuprofen for the headaches if needed.  Patient appears to be tolerating medication.   Mom asking for refill as started with 1ML twice daily.    Will review with Provider and order medication.  Voices understanding.

## 2023-09-08 ENCOUNTER — TELEPHONE (OUTPATIENT)
Dept: NEUROSURGERY | Facility: CLINIC | Age: 28
End: 2023-09-08
Payer: COMMERCIAL

## 2023-09-08 NOTE — TELEPHONE ENCOUNTER
Mother left message asking for a callback.    Callback to Mother, Karol, leaving a message will call back on Monday.

## 2023-09-11 ENCOUNTER — TELEPHONE (OUTPATIENT)
Dept: NEUROSURGERY | Facility: CLINIC | Age: 28
End: 2023-09-11
Payer: COMMERCIAL

## 2023-09-11 NOTE — TELEPHONE ENCOUNTER
Spoke with Mother, Carter is still having issues with facial pain, states having behavior problems at work, patient states issues, but can not elaborate, Down's syndrome.    Taking small amount of Oxcarbazepine liquid at this time, not sure if helping or not.  Appointment set for 9/12 w Cyndi Borges NP at 11AM to  discuss over the phone.      Voices understaniding

## 2023-09-12 ENCOUNTER — VIRTUAL VISIT (OUTPATIENT)
Dept: NEUROSURGERY | Facility: CLINIC | Age: 28
End: 2023-09-12
Payer: COMMERCIAL

## 2023-09-12 DIAGNOSIS — R51.9 FACIAL PAIN: Primary | ICD-10-CM

## 2023-09-12 PROCEDURE — 99213 OFFICE O/P EST LOW 20 MIN: CPT | Mod: 93 | Performed by: NURSE PRACTITIONER

## 2023-09-12 ASSESSMENT — PAIN SCALES - GENERAL: PAINLEVEL: WORST PAIN (10)

## 2023-09-12 NOTE — NURSING NOTE
Is the patient currently in the state of [place]? YES    Visit mode:TELEPHONE    If the visit is dropped, the patient can be reconnected by: VIDEO VISIT: Text to cell phone:   Telephone Information:   Mobile [phone]       Will anyone else be joining the visit? NO  (If patient encounters technical issues they should call [phone]    How would you like to obtain your AVS? MyChart    Are changes needed to the allergy or medication list? YES  Medical marijuana tablet form   Ibuprofen     Reason for visit: follow-up     Shawna JEWLEL

## 2023-09-12 NOTE — LETTER
2023       RE: Carter Guaman  [address] [place] [address]     Dear Colleague,    Thank you for referring your patient, Carter Guaman, to the [facility] NEUROSURGERY CLINIC [place] at [facility]. Please see a copy of my visit note below.    Virtual Visit Details    Type of service:  Telephone Visit   Phone call duration: 25 minutes     [facility]  Department of Neurosurgery      Name: Carter Guaman  MRN: [id number]  Age: 28 year old  : 1995  Referring provider: No ref. provider found  2023      Chief Complaint:   Right facial pain  Follow-up     History of Present Illness:    Carter Guaman is a 28 year old male with a history of Down syndrome who is here today for a follow-up for right sided facial pain. I had a phone visit with patient's mom, Karol.     In 2022, patient had covid. Per parents, since this infection he started to rub the right side of his nose frequently and appeared uncomfortable. He also had frequent sinus infections and subsequently underwent sinus surgery (Right maxillary antrostomy/right uncinectomy with tissue removal and Left sphenoidotomy with tissue removal) in 2023.      Patient continued to report discomfort and was evaluated by a Neurology provider. Trigeminal neuralgia was brought up as a possible diagnosis. Patient was started on Gabapentin and later on Lamotrigine. Patient had blisters/ rash from these medications.     Initial visit with me in 2023. Oxcarbazepine trial was recommended at that time and patient was started on this medication. Currently takes 300 mg twice daily. Per mom, it is unclear whether this medication is effective or not as patient is non verbal due to his Down syndrome. Per mom, immediately after starting this medication, patient was showing less cues of discomfort and they assumed the medication was working. But for the past few weeks, patient has been  "really struggling. He is more angry and \"constantly rubbing his right side of his nose\". Also had some trouble at his work place due to behavioral reasons. Due to his symptoms, his sleep has been affected. He eats and drink without any issues.     Per mom, she helps him with shower and while he is in the shower, he constantly tries to clear his nostrils/ sinuses. On a prior MRI, mild chronic inflammatory mucosal thickening in the right maxillary sinus was noted and she is wondering whether his facial pain is from that rather than possible trigeminal neuralgia.       Review of Systems:   Pertinent items are noted in HPI or as in patient entered ROS below, remainder of complete ROS is negative.        No data to display                 Imagin2023 MRI Brain:  1. No acute intracranial abnormality evident.   2. No intracranial mass or mass effect. No abnormal intracranial enhancement. No focal parenchymal signal abnormality. No hydrocephalus.   3. No abnormal enhancement along the trigeminal nerves. No abnormality in the expected location of the trigeminal nuclei. Vascular loop contacting the medial surface of the cisternal segment of the right trigeminal nerve.       Assessment:  Right facial pain  Follow-up     Plan:  28 year old male with right sided facial pain. Main challenge is that patient has limited verbal capacity secondary to Down syndrome.  Patient's mom is concerned about the chronic inflammatory mucosal thickening in the right maxillary sinus shown on a prior brain MRI and is wondering whether that could be the reason for his facial pain rather than possible trigeminal neuralgia. She would like to gradually reduce Oxcarbazepine dose to see whether there is significant worsening of his facial pain.     He was evaluated by ENT at [facility] and tells me that they are having a hard time to arrange a follow-up visit with his provider. She is intersted in establishing care with someone in " [facility] system. Will send a message a ENT clinic schedulers.        I spent 25 minutes on patient care activities related to this encounter on the date of service, including time spent reviewing the chart, obtaining history and examination and in counseling the patient, and in documentation in the electronic medical record.          Again, thank you for allowing me to participate in the care of your patient.      Sincerely,    TELLY Emery CNP

## 2023-09-12 NOTE — PROGRESS NOTES
"Virtual Visit Details    Type of service:  Telephone Visit   Phone call duration: 25 minutes     [facility]  Department of Neurosurgery      Name: Carter Guaman  MRN: [id number]  Age: 28 year old  : 1995  Referring provider: No ref. provider found  2023      Chief Complaint:   Right facial pain  Follow-up     History of Present Illness:    Carter Guaman is a 28 year old male with a history of Down syndrome who is here today for a follow-up for right sided facial pain. I had a phone visit with patient's mom, Karol.     In 2022, patient had covid. Per parents, since this infection he started to rub the right side of his nose frequently and appeared uncomfortable. He also had frequent sinus infections and subsequently underwent sinus surgery (Right maxillary antrostomy/right uncinectomy with tissue removal and Left sphenoidotomy with tissue removal) in 2023.      Patient continued to report discomfort and was evaluated by a Neurology provider. Trigeminal neuralgia was brought up as a possible diagnosis. Patient was started on Gabapentin and later on Lamotrigine. Patient had blisters/ rash from these medications.     Initial visit with me in 2023. Oxcarbazepine trial was recommended at that time and patient was started on this medication. Currently takes 300 mg twice daily. Per mom, it is unclear whether this medication is effective or not as patient is non verbal due to his Down syndrome. Per mom, immediately after starting this medication, patient was showing less cues of discomfort and they assumed the medication was working. But for the past few weeks, patient has been really struggling. He is more angry and \"constantly rubbing his right side of his nose\". Also had some trouble at his work place due to behavioral reasons. Due to his symptoms, his sleep has been affected. He eats and drink without any issues.     Per mom, she helps him with shower and while he is in the shower, he " constantly tries to clear his nostrils/ sinuses. On a prior MRI, mild chronic inflammatory mucosal thickening in the right maxillary sinus was noted and she is wondering whether his facial pain is from that rather than possible trigeminal neuralgia.       Review of Systems:   Pertinent items are noted in HPI or as in patient entered ROS below, remainder of complete ROS is negative.        No data to display                 Imagin2023 MRI Brain:  1. No acute intracranial abnormality evident.   2. No intracranial mass or mass effect. No abnormal intracranial enhancement. No focal parenchymal signal abnormality. No hydrocephalus.   3. No abnormal enhancement along the trigeminal nerves. No abnormality in the expected location of the trigeminal nuclei. Vascular loop contacting the medial surface of the cisternal segment of the right trigeminal nerve.       Assessment:  Right facial pain  Follow-up     Plan:  28 year old male with right sided facial pain. Main challenge is that patient has limited verbal capacity secondary to Down syndrome.  Patient's mom is concerned about the chronic inflammatory mucosal thickening in the right maxillary sinus shown on a prior brain MRI and is wondering whether that could be the reason for his facial pain rather than possible trigeminal neuralgia. She would like to gradually reduce Oxcarbazepine dose to see whether there is significant worsening of his facial pain.     He was evaluated by ENT at [facility] and tells me that they are having a hard time to arrange a follow-up visit with his provider. She is intersted in establishing care with someone in [facility] system. Will send a message a ENT clinic schedulers.     Addendum on : Message sent to ENT clinic coordinators for an appointment.        I spent 25 minutes on patient care activities related to this encounter on the date of service, including time spent reviewing the chart, obtaining history and examination  and in counseling the patient, and in documentation in the electronic medical record.      Cyndi VARNER CNP  Department of Neurosurgery

## 2023-09-14 ENCOUNTER — TELEPHONE (OUTPATIENT)
Dept: NEUROSURGERY | Facility: CLINIC | Age: 28
End: 2023-09-14
Payer: COMMERCIAL

## 2023-09-14 DIAGNOSIS — R51.9 FACIAL PAIN: ICD-10-CM

## 2023-09-14 RX ORDER — OXCARBAZEPINE 60 MG/ML
300 SUSPENSION ORAL 2 TIMES DAILY
Qty: 300 ML | Refills: 1 | Status: SHIPPED | OUTPATIENT
Start: 2023-09-14 | End: 2023-10-13

## 2023-09-14 NOTE — TELEPHONE ENCOUNTER
Spoke with Mother, Oxcarbazepine refilled,at Office Visit on 9/12/23 Mom will start to taper medication, taking liquid form to see if facial twinges improve, worsen or stay the same.      Cyndi was going to visit with ENT in Facial Pain to check on any recommendations .    Voices understanding.

## 2023-09-19 NOTE — TELEPHONE ENCOUNTER
FUTURE VISIT INFORMATION      FUTURE VISIT INFORMATION:  Date: 11/1/23  Time: 3:20pm  [place] [facility] [place]  REFERRAL INFORMATION:  Referring provider: Cyndi Borges APRN CNP   Referring providers clinic:  [facility]  Reason for visit/diagnosis  chronic inflammatory mucosal thickening in the right maxillary sinus shown on a prior brain MRI and frequent sinus infections     RECORDS REQUESTED FROM:       Clinic name Comments Records Status Imaging Status   [facility]  9/12/23, 7/21/23- ov Cyndi Borges APRN CNP  [facility]     Neurology [facility]  6/27/23- ov    Scanned in epic  Pacs    [facility]  6/28/23- mri brain   5/23/23- ct head   1/7/23- ct sinus, ct head     Office visit:  6/27/23- Ov Annie Waldron APRN, CNP    5/23/23- ED    Procedure:  4/4/23- Right Maxillary surgery  Care everywhere  Pacs    [facility]  12/21/22- CT sinus     Office Visit:  12/8/22- Ov Felix Feliciano NP   10/1/22- Ov Tayler Galvez NP    9/19/22- Ov Monserrat Lemus NP    7/14/22- Ov Bridget Ramirez NP [facility] 9/19/23-  Pending re    PACS     September 19, 2023 11:58 AM - Faxed a request to Melissa to push Image to [facility] PACS- Niesha   September 28, 2023 8:58 AM - Received image in pacs and attached it to patient- Niesha

## 2023-09-28 ENCOUNTER — TELEPHONE (OUTPATIENT)
Dept: NEUROSURGERY | Facility: CLINIC | Age: 28
End: 2023-09-28
Payer: COMMERCIAL

## 2023-09-28 NOTE — TELEPHONE ENCOUNTER
Mother states patient having horrible days with facial pain. Patient back on 5ML Oxcarbazepine and still complaining of eye pains, both sides hard to determine which side is worse.  Patient had appointment with ENT, nothing found to cause facial pain.  Mom states when lowered Oxcarbazepine the patient complained of more pain so when back up to 5ML.    Lab work:  Oxcarbazepine level at   OXCARBAZEPINE (MEDTOX) 7.6 Low        Therapeutic range starts at 10.      Mother just does not know what to do with the facial pain that patient complains of.    Will refer to Cyndi Borges NP and get back to patient.

## 2023-10-12 ENCOUNTER — TELEPHONE (OUTPATIENT)
Dept: NEUROSURGERY | Facility: CLINIC | Age: 28
End: 2023-10-12
Payer: COMMERCIAL

## 2023-10-12 NOTE — TELEPHONE ENCOUNTER
"Spoke with Mom, Mom states patient doing \"awful\", states his affect is changing with what appears facial pain  Mom states pain remains on bridge of nose area and both eyes.  No improvement on Oxcarbazepine 300mg/5mg suspension twice daily.  Explained Oxcarbazepine level was low, can increase medication if Na level is ok.    Fax BMP to [facility] in [place]     Mom states patient has been vomiting at times, not eating as well.    Mom is very worried she is \"losing him slowly\"    Out bound call to [place] [facility], making sure fax received for BMP.  After BMP can increase Oxcarb medication.if Na is ok.      Outbound call to Mom, Fax received at [place] for BMP.  Please call for lab  appointment .  Mom states will try for appointment today or tomorrow.    Voices understanding, will watch for lab results.    "

## 2023-10-13 ENCOUNTER — TELEPHONE (OUTPATIENT)
Dept: NEUROSURGERY | Facility: CLINIC | Age: 28
End: 2023-10-13
Payer: COMMERCIAL

## 2023-10-13 DIAGNOSIS — R51.9 FACIAL PAIN: ICD-10-CM

## 2023-10-13 RX ORDER — OXCARBAZEPINE 60 MG/ML
600 SUSPENSION ORAL 2 TIMES DAILY
Qty: 600 ML | Refills: 1 | Status: SHIPPED | OUTPATIENT
Start: 2023-10-13 | End: 2024-01-29

## 2023-10-13 NOTE — TELEPHONE ENCOUNTER
Spoke with MOM, BMP completed,  Na 142 high normal  Carbazepine Level completed and below normal.    Patient continues to complain of eyes face hurting. Oxcarbazepine increased from 5ml to 10ml liquid twice daily per Cyndi Borges NP    Facial pain clinic appointment set for 11/20/23 @ 345.  Mom will callback in 5 days or so with update on patient after increasing medication.    Voices understanding.

## 2023-10-20 ENCOUNTER — TELEPHONE (OUTPATIENT)
Dept: NEUROSURGERY | Facility: CLINIC | Age: 28
End: 2023-10-20
Payer: COMMERCIAL

## 2023-10-20 NOTE — TELEPHONE ENCOUNTER
"Spoke with Mother, Oxcarbazepine now at 7.5, not relieving patient facial pain.  Mom states pain is getting worse behind both eyes.  Mom is desperately finding a reason for bilateral eye pain.  Pain behind eyes for past 2 years and no trigeminal type medication has helped in any way.  Pain was \"severe \" this morning, patient was talking this morning \"hospital\".    Mom will increase Oxcarbazepine to 10ml and see if that is any help at all with bilateral eye pain.  Patient does have appointment with [facility] Nov 20th.  MRI Brain showed no issues with Trigemenial nerves. Patient has appointment with specialty eye doctor next week who treats patients with disabilities.  Nothing has showed up in the past with eye issues.      Mom will keep us informed if the medication helps bilateral eye pain.  Voices understanding, very tough call.    "

## 2023-11-01 ENCOUNTER — PRE VISIT (OUTPATIENT)
Dept: OTOLARYNGOLOGY | Facility: CLINIC | Age: 28
End: 2023-11-01

## 2023-11-02 ENCOUNTER — TELEPHONE (OUTPATIENT)
Dept: NEUROSURGERY | Facility: CLINIC | Age: 28
End: 2023-11-02
Payer: COMMERCIAL

## 2023-11-02 NOTE — TELEPHONE ENCOUNTER
Spoke with Father Kartik.  Kartik states the mediation is not helping any of the facial pain and now appears to be complaining on pain on both sides of his face.    Father states now patient is difficult to get out of bed every morning and get going.  Father states patient in facial/head pain.  Explained unknown real cause of pain, evidently the Oxcarbazepine is not helping.  Patient can go to ED if needed.  And Yes, I will ask for a referral to [facility].    Voices understanding.

## 2023-11-02 NOTE — TELEPHONE ENCOUNTER
[facility] Call Center    Phone Message    May a detailed message be left on voicemail: yes     Reason for Call: Other: Patient's father called regarding wanting a referral to get the patient into the [facility] system. Please call back to discuss when available.      Action Taken: Other: Neurosurgery    Travel Screening: Not Applicable

## 2023-11-20 ENCOUNTER — VIRTUAL VISIT (OUTPATIENT)
Dept: OTOLARYNGOLOGY | Facility: CLINIC | Age: 28
End: 2023-11-20
Payer: COMMERCIAL

## 2023-11-20 VITALS — WEIGHT: 160 LBS | HEIGHT: 61 IN | BODY MASS INDEX: 30.21 KG/M2

## 2023-11-20 DIAGNOSIS — R51.9 FACIAL PAIN: ICD-10-CM

## 2023-11-20 DIAGNOSIS — R51.9 POST-COVID CHRONIC HEADACHE: Primary | ICD-10-CM

## 2023-11-20 DIAGNOSIS — G44.001 INTRACTABLE CLUSTER HEADACHE SYNDROME, UNSPECIFIED CHRONICITY PATTERN: Primary | ICD-10-CM

## 2023-11-20 DIAGNOSIS — G89.29 POST-COVID CHRONIC HEADACHE: Primary | ICD-10-CM

## 2023-11-20 DIAGNOSIS — Q90.9 DOWN SYNDROME: ICD-10-CM

## 2023-11-20 DIAGNOSIS — G43.911 INTRACTABLE MIGRAINE WITH STATUS MIGRAINOSUS, UNSPECIFIED MIGRAINE TYPE: Primary | ICD-10-CM

## 2023-11-20 DIAGNOSIS — U09.9 POST-COVID CHRONIC HEADACHE: Primary | ICD-10-CM

## 2023-11-20 PROCEDURE — 99214 OFFICE O/P EST MOD 30 MIN: CPT | Mod: VID | Performed by: NEUROLOGICAL SURGERY

## 2023-11-20 PROCEDURE — 99213 OFFICE O/P EST LOW 20 MIN: CPT | Mod: VID | Performed by: PSYCHIATRY & NEUROLOGY

## 2023-11-20 RX ORDER — TOPIRAMATE SPINKLE 25 MG/1
25 CAPSULE ORAL AT BEDTIME
Qty: 30 CAPSULE | Refills: 0 | Status: SHIPPED | OUTPATIENT
Start: 2023-11-20 | End: 2023-12-04

## 2023-11-20 RX ORDER — SOD CHLOR,BICARB/SQUEEZ BOTTLE
PACKET, WITH RINSE DEVICE NASAL
COMMUNITY
Start: 2023-04-04 | End: 2024-01-29

## 2023-11-20 NOTE — PROGRESS NOTES
Virtual Visit Details    Type of service:  Video Visit   Video Start Time: 4:02 PM  Video End Time:4:26 PM    Originating Location (pt. Location): Home    Distant Location (provider location):  Off-site  Platform used for Video Visit: [employer] (Telehealth)    Comprehensive Facial Pain Clinic Note      This patient was seen in a multidisciplinary clinic between Neurosurgery, Neurology, Dentistry, Pharmacy, and ENT. Please refer to all notes from the encounter, which are written by several providers.    History of Present Illness  Carter Guaman is a 28 year old gentleman with Down syndrome who began having facial pain after a COVID infection back in January of 2022. He began rubbing the side of this nose and appeared uncomfortable. He underwent sinus surgery for possible sinus infections in April. He continued to have issues with discomfort on the right and tried gabapentin and lamictal but these resulted in rashes.   Behaviorally he is constantly touching his face and pushing on it. He will press against his face with significant pressure. He does seem to avoid light in the morning.   He doesn't seem to avoid triggers.  He was started on trileptal that may have briefly improved these behaviors but with further increases to 1200 mg per day he did not see further relief.   His symptoms have been bilateral now and he is basically relegated to bed most of the time. Sometimes it takes his mother 3-4 hours to get him out of bed.     He has not tried any migraine medications.       Allergies   Allergen Reactions    Gabapentin Blisters    Lamotrigine Rash       Current Outpatient Medications   Medication    cloNIDine (CATAPRES) 0.2 MG tablet    CLONIDINE HCL PO    Hypertonic Nasal Wash (SINUS RINSE KIT) PACK    ketotifen (ZADITOR) 0.025 % ophthalmic solution    OXcarbazepine (TRILEPTAL) 300 MG/5ML suspension    sertraline (ZOLOFT) 50 MG tablet    SERTRALINE HCL PO     No current facility-administered medications for this visit.        ROS: 10 point ROS neg other than the symptoms noted above in the HPI.        Imaging: my interpretations only  MRI of the skull base with and without contrast 6/28/2023: small vessel in contact with the Right trigeminal nerve.        Assessment and Plan   Carter Guaman is a 28 year old male with Down syndrome and facial pain that does not seem consistent with trigeminal neuralgia. He has now bilateral symptoms and does not seem to have responded to trileptal. His MRI does not have any significant vascular compression. His symptoms seem to have been provoked after a COVID infection but the causal nature is unclear. The highest differential is now a complex, intractable migraine. Given that he is nonverbal, classic migraine treatment will be challenging. Eventually an anticgrp would be the most beneficial but we may start with a trial of topamax.       Topamax titration        Hollis Penaloza MD  Department of Neurosurgery  [facility]

## 2023-11-20 NOTE — PROGRESS NOTES
Virtual Visit Details    Type of service:  Video Visit   Video Start Time:  3:45pm  Video End Time: 4:26pm    Originating Location (pt. Location): Home    Distant Location (provider location):  On-site  Platform used for Video Visit: [employer] (Telehealth)    Facial Pain Clinic Neurology Note:    Mr. Carter Guaman is a 28 year old male with Down's syndrome, who is largely non-verbal and visit was conducted with his parents present who could report the history of observed pain behaviors and provided video on their cell phone of these for our review.    Symptoms began post COVID, and would invoke nasal rubbing, patient would verbalize 'pain'  Nasal surgery and procedures were performed for sinusitis without benefit.  Neuro provider  raised the question of right trigeminal neuralgia  Pain behaviors continued now on oxcarbazepine, could not get out of bed in the am on this medication, did not really think it was helping, level was barely elevated and sodium was not impacted, increased to 10mL per day and kept there a while, then reduced since the start of Nov and now is at 5ML per day (tomorrow will be at 4.5mL)  600mg bid, POST Nerve block, repeated cheek and lip biting (many weeks to heal) -trophic syndrome (facial numbness)    Parents report today that currently pain is on both sides on the maxilla and the eyes, favors the right  All bite marks healed after the nerve block  He rarely says 'itch' and family goes on assumptions of what he means    They did try benadryl and sinus related treatments, and this was not effective    Tylenol and ibuprofen were trialed, for 1 year plus  They did return to an eye doctor (2 years ago), and he was placed on 2 different eye drops  pattaday and refresh BID    Gabapentin 100mg bedtime - rash and stopped, did not change behaviors in 5 days  Lamotrigine 25mg twice daily - rash and stopped, did not change behaviors in 5 days    He may have some photophobia, and he may be touching hard and he  is applying pressure to the area and to his forehead, despite lowered oxcarbazepine, he is still staying in bed and not participating in activities.    Recommendations:  This could represent facial variant of migraine (post-COVID headache) in the absence of any chronic sinusitis.   -Start 25mg topiramate capsule bedtime - increase dose by 25mg every week up to 100mg bedtime dosing for a trial of 3 months. If he experiences worsened fatigue, paresthesias, renal stones, weight loss that is intolerable, requested that his parents contact us.  -He will initially follow up with Cyndi Borges, but I will see him in my clinic in 3 months thereafter in follow up.

## 2023-11-20 NOTE — LETTER
11/20/2023       RE: Carter Guaman  [address] [place] [address]     Dear Colleague,    Thank you for referring your patient, Carter Guaman, to the [facility] EAR NOSE AND THROAT CLINIC [place] at [facility]. Please see a copy of my visit note below.      Facial Pain Clinic Neurology Note:    Mr. Carter Guaman is a 28 year old male with Down's syndrome, who is largely non-verbal and visit was conducted with his parents present who could report the history of observed pain behaviors and provided video on their cell phone of these for our review.    Symptoms began post COVID, and would invoke nasal rubbing, patient would verbalize 'pain'  Nasal surgery and procedures were performed for sinusitis without benefit.  Neuro provider  raised the question of right trigeminal neuralgia  Pain behaviors continued now on oxcarbazepine, could not get out of bed in the am on this medication, did not really think it was helping, level was barely elevated and sodium was not impacted, increased to 10mL per day and kept there a while, then reduced since the start of Nov and now is at 5ML per day (tomorrow will be at 4.5mL)  600mg bid, POST Nerve block, repeated cheek and lip biting (many weeks to heal) -trophic syndrome (facial numbness)    Parents report today that currently pain is on both sides on the maxilla and the eyes, favors the right  All bite marks healed after the nerve block  He rarely says 'itch' and family goes on assumptions of what he means    They did try benadryl and sinus related treatments, and this was not effective    Tylenol and ibuprofen were trialed, for 1 year plus  They did return to an eye doctor (2 years ago), and he was placed on 2 different eye drops  pattaday and refresh BID    Gabapentin 100mg bedtime - rash and stopped, did not change behaviors in 5 days  Lamotrigine 25mg twice daily - rash and stopped, did not change behaviors  in 5 days    He may have some photophobia, and he may be touching hard and he is applying pressure to the area and to his forehead, despite lowered oxcarbazepine, he is still staying in bed and not participating in activities.    Recommendations:  This could represent facial variant of migraine (post-COVID headache) in the absence of any chronic sinusitis.   -Start 25mg topiramate capsule bedtime - increase dose by 25mg every week up to 100mg bedtime dosing for a trial of 3 months. If he experiences worsened fatigue, paresthesias, renal stones, weight loss that is intolerable, requested that his parents contact us.  -He will initially follow up with Cyndi Borges, but I will see him in my clinic in 3 months thereafter in follow up.      Again, thank you for allowing me to participate in the care of your patient.      Sincerely,    Meliza Garcia MD

## 2023-11-20 NOTE — PROGRESS NOTES
"Virtual Visit Details    Type of service:  Video Visit   Video Start Time: {video visit start/end time for provider to select:335504}  Video End Time:{video visit start/end time for provider to select:708027}    Originating Location (pt. Location): {video visit patient location:612353::\"Home\"}  {PROVIDER LOCATION On-site should be selected for visits conducted from your clinic location or adjoining [facility] hospital, academic office, or other nearby [facility] building. Off-site should be selected for all other provider locations, including home:821638}  Distant Location (provider location):  {virtual location provider:752919}  Platform used for Video Visit: {Virtual Visit Platforms:690350::\"[employer]\"}    "

## 2023-11-20 NOTE — LETTER
11/20/2023       RE: Carter Guaman  [address] [place] [address]     Dear Colleague,    Thank you for referring your patient, Carter Guaman, to the [facility] EAR NOSE AND THROAT CLINIC [place] at [facility]. Please see a copy of my visit note below.      Comprehensive Facial Pain Clinic Note      This patient was seen in a multidisciplinary clinic between Neurosurgery, Neurology, Dentistry, Pharmacy, and ENT. Please refer to all notes from the encounter, which are written by several providers.    History of Present Illness  Carter Guaman is a 28 year old gentleman with Down syndrome who began having facial pain after a COVID infection back in January of 2022. He began rubbing the side of this nose and appeared uncomfortable. He underwent sinus surgery for possible sinus infections in April. He continued to have issues with discomfort on the right and tried gabapentin and lamictal but these resulted in rashes.   Behaviorally he is constantly touching his face and pushing on it. He will press against his face with significant pressure. He does seem to avoid light in the morning.   He doesn't seem to avoid triggers.  He was started on trileptal that may have briefly improved these behaviors but with further increases to 1200 mg per day he did not see further relief.   His symptoms have been bilateral now and he is basically relegated to bed most of the time. Sometimes it takes his mother 3-4 hours to get him out of bed.     He has not tried any migraine medications.       Allergies   Allergen Reactions    Gabapentin Blisters    Lamotrigine Rash       Current Outpatient Medications   Medication    cloNIDine (CATAPRES) 0.2 MG tablet    CLONIDINE HCL PO    Hypertonic Nasal Wash (SINUS RINSE KIT) PACK    ketotifen (ZADITOR) 0.025 % ophthalmic solution    OXcarbazepine (TRILEPTAL) 300 MG/5ML suspension    sertraline (ZOLOFT) 50 MG tablet     SERTRALINE HCL PO     No current facility-administered medications for this visit.       ROS: 10 point ROS neg other than the symptoms noted above in the HPI.        Imaging: my interpretations only  MRI of the skull base with and without contrast 6/28/2023: small vessel in contact with the Right trigeminal nerve.        Assessment and Plan   Carter Guaman is a 28 year old male with Down syndrome and facial pain that does not seem consistent with trigeminal neuralgia. He has now bilateral symptoms and does not seem to have responded to trileptal. His MRI does not have any significant vascular compression. His symptoms seem to have been provoked after a COVID infection but the causal nature is unclear. The highest differential is now a complex, intractable migraine. Given that he is nonverbal, classic migraine treatment will be challenging. Eventually an anticgrp would be the most beneficial but we may start with a trial of topamax.       Topamax titration        Hollis Penaloza MD  Department of Neurosurgery  [facility]

## 2023-11-24 ENCOUNTER — TELEPHONE (OUTPATIENT)
Dept: NEUROSURGERY | Facility: CLINIC | Age: 28
End: 2023-11-24
Payer: COMMERCIAL

## 2023-12-04 ENCOUNTER — VIRTUAL VISIT (OUTPATIENT)
Dept: NEUROSURGERY | Facility: CLINIC | Age: 28
End: 2023-12-04
Payer: COMMERCIAL

## 2023-12-04 DIAGNOSIS — G44.001 INTRACTABLE CLUSTER HEADACHE SYNDROME, UNSPECIFIED CHRONICITY PATTERN: Primary | ICD-10-CM

## 2023-12-04 PROCEDURE — 99443 PR PHYSICIAN TELEPHONE EVALUATION 21-30 MIN: CPT | Mod: 93 | Performed by: NURSE PRACTITIONER

## 2023-12-04 RX ORDER — TOPIRAMATE SPINKLE 25 MG/1
CAPSULE ORAL
Qty: 150 CAPSULE | Refills: 0 | Status: SHIPPED | OUTPATIENT
Start: 2023-12-04 | End: 2023-12-27

## 2023-12-04 ASSESSMENT — PAIN SCALES - GENERAL: PAINLEVEL: NO PAIN (0)

## 2023-12-04 NOTE — PATIENT INSTRUCTIONS
Continue to titrate Topamax as follows:   Week 1 and 2: Topamax 25 mg twice daily    Week 3: Topamax 25 mg in the morning and 50 mg in the evening     Week 4: Topamax 50 mg twice daily.    2. Follow-up with Dr. Garcia in 2/2024.

## 2023-12-04 NOTE — PROGRESS NOTES
Virtual Visit Details    Type of service:  Telephone Visit   Phone call duration: 20 minutes     [facility]  Department of Neurosurgery      Name: Carter Guaman  MRN: [id number]  Age: 28 year old  : 1995  Referring provider: No ref. provider found  2023      Chief Complaint:   Facial pain  Follow-up     History of Present Illness:   Per Dr. Penaloza's notes on 2023, Carter Guaman is a 28 year old gentleman with Down syndrome who began having facial pain after a COVID infection back in 2022. He began rubbing the side of his nose and appeared uncomfortable. He underwent sinus surgery for possible sinus infections in April. He continued to have issues with discomfort on the right and tried gabapentin and lamictal but these resulted in rashes.   Behaviorally he is constantly touching his face and pushing on it. He will press against his face with significant pressure. He does seem to avoid light in the morning.   He doesn't seem to avoid triggers.  He was started on trileptal that may have briefly improved these behaviors but with further increases to 1200 mg per day he did not see further relief.   His symptoms have been bilateral now and he is basically relegated to bed most of the time. Sometimes it takes his mother 3-4 hours to get him out of bed.     He was seen in [facility] recently and possible diagnosis of complex migraine was discussed. Patient was started on a trial of Topamax.     Today, I had a phone visit with parents.  Carter is currently off of oxcarbazepine.  Last dose was on 2023.  He has been on Topamax 25 mg daily.  They are unsure whether there is any improvement in his symptoms at this point.  For the past 2 weeks or so, patient appeared to be off balance a few times.    Review of Systems:   Pertinent items are noted in HPI or as in patient entered ROS below, remainder of complete ROS is negative.        No data to display                Assessment:  Facial pain, atypical  Possible complex migraine  Follow-up    Plan:  We will continue with gradual increase of Topamax up to 100 mg daily.  Patient to follow-up with Dr. Garcia in February 2024.       I spent 25 minutes on patient care activities related to this encounter on the date of service, including time spent reviewing the chart, obtaining history and examination and in counseling the patient, and in documentation in the electronic medical record.      Cyndi VARNER CNP  Department of Neurosurgery

## 2023-12-04 NOTE — LETTER
2023       RE: Carter Guaman  [address] [place] [address]       Dear Colleague,    Thank you for referring your patient, Carter Guaman, to the [facility] NEUROSURGERY CLINIC [place] at [facility]. Please see a copy of my visit note below.      [facility]  Department of Neurosurgery      Name: Carter Guaman  MRN: [id number]  Age: 28 year old  : 1995  Referring provider: No ref. provider found  2023      Chief Complaint:   Facial pain  Follow-up     History of Present Illness:   Per Dr. Penaloza's notes on 2023, Carter Guaman is a 28 year old gentleman with Down syndrome who began having facial pain after a COVID infection back in 2022. He began rubbing the side of his nose and appeared uncomfortable. He underwent sinus surgery for possible sinus infections in April. He continued to have issues with discomfort on the right and tried gabapentin and lamictal but these resulted in rashes.   Behaviorally he is constantly touching his face and pushing on it. He will press against his face with significant pressure. He does seem to avoid light in the morning.   He doesn't seem to avoid triggers.  He was started on trileptal that may have briefly improved these behaviors but with further increases to 1200 mg per day he did not see further relief.   His symptoms have been bilateral now and he is basically relegated to bed most of the time. Sometimes it takes his mother 3-4 hours to get him out of bed.     He was seen in [facility] recently and possible diagnosis of complex migraine was discussed. Patient was started on a trial of Topamax.     Today, I had a phone visit with parents.  Carter is currently off of oxcarbazepine.  Last dose was on 2023.  He has been on Topamax 25 mg daily.  They are unsure whether there is any improvement in his symptoms at this point.  For the  past 2 weeks or so, patient appeared to be off balance a few times.    Review of Systems:   Pertinent items are noted in HPI or as in patient entered ROS below, remainder of complete ROS is negative.        No data to display               Assessment:  Facial pain, atypical  Possible complex migraine  Follow-up    Plan:  We will continue with gradual increase of Topamax up to 100 mg daily.  Patient to follow-up with Dr. Garcia in February 2024.       I spent 25 minutes on patient care activities related to this encounter on the date of service, including time spent reviewing the chart, obtaining history and examination and in counseling the patient, and in documentation in the electronic medical record.        Again, thank you for allowing me to participate in the care of your patient.      Sincerely,    TELLY Emery CNP

## 2023-12-04 NOTE — NURSING NOTE
Is the patient currently in the state of [place]? YES    Visit mode:TELEPHONE    If the visit is dropped, the patient can be reconnected by: TELEPHONE VISIT: Phone number:   Telephone Information:   Mobile [phone]       Will anyone else be joining the visit? NO  (If patient encounters technical issues they should call [phone] :[id number])    How would you like to obtain your AVS? MyChart    Are changes needed to the allergy or medication list? No    Reason for visit: Follow-up     Shawna JEWELL

## 2023-12-27 DIAGNOSIS — G44.001 INTRACTABLE CLUSTER HEADACHE SYNDROME, UNSPECIFIED CHRONICITY PATTERN: ICD-10-CM

## 2023-12-28 RX ORDER — TOPIRAMATE SPINKLE 25 MG/1
50 CAPSULE ORAL 2 TIMES DAILY
Qty: 360 CAPSULE | Refills: 0 | Status: SHIPPED | OUTPATIENT
Start: 2023-12-28 | End: 2024-03-05

## 2023-12-28 NOTE — TELEPHONE ENCOUNTER
Outbound call to Karol , leaving a message with the Topamax 25mg refill.  Patient should continue current wrap up dosage, no sure exactly dosage at this time, please keep current schedule , ordered Topamax 25mg taking 2 tablets twice daily, you can use as needed tapering up to the dosage of 50mg twice daily as directed by Cyndi Borges NP at  on 12/4/23.    Thank you,  Nupur OTERO

## 2024-01-29 ENCOUNTER — VIRTUAL VISIT (OUTPATIENT)
Dept: NEUROLOGY | Facility: CLINIC | Age: 29
End: 2024-01-29
Payer: COMMERCIAL

## 2024-01-29 ENCOUNTER — TELEPHONE (OUTPATIENT)
Dept: NEUROLOGY | Facility: CLINIC | Age: 29
End: 2024-01-29

## 2024-01-29 VITALS — WEIGHT: 165 LBS | HEIGHT: 61 IN | BODY MASS INDEX: 31.15 KG/M2

## 2024-01-29 DIAGNOSIS — G43.009 MIGRAINE WITHOUT AURA AND WITHOUT STATUS MIGRAINOSUS, NOT INTRACTABLE: Primary | ICD-10-CM

## 2024-01-29 PROCEDURE — 99214 OFFICE O/P EST MOD 30 MIN: CPT | Mod: 95 | Performed by: PSYCHIATRY & NEUROLOGY

## 2024-01-29 RX ORDER — CIPROFLOXACIN AND DEXAMETHASONE 3; 1 MG/ML; MG/ML
4 SUSPENSION/ DROPS AURICULAR (OTIC)
COMMUNITY
Start: 2024-01-26 | End: 2024-02-02

## 2024-01-29 ASSESSMENT — PAIN SCALES - GENERAL: PAINLEVEL: NO PAIN (0)

## 2024-01-29 NOTE — LETTER
1/29/2024         RE: Carter Guaman  [address] [place] [address]        Dear Colleague,    Thank you for referring your patient, Carter Guaman, to the [facility] NEUROLOGY CLINIC [facility]. Please see a copy of my visit note below.    Virtual Visit Details    Type of service:  Video Visit   Video Start Time: 11:45 AM  Video End Time:12:08 PM    Originating Location (pt. Location): Home    Distant Location (provider location):  On-site  Platform used for Video Visit: [employer]    He unfortunately had an ongoing ear infection for about 1 month and is seeing ENT. The nose rubbing has improved tremendously. Who he is has come back. He did have some breakthrough pain and then his mother increased to 75mg twice daily.     We discussed that this is common in patients with migraine, usually mediated by systemic factors. She can consider reducing his dose to 50mg twice daily after the ear infection is adequately treated.     He was on 25mg twice daily and then stopped at 50mg twice topiramate. He is completely off oxcarbazepine.     We will have a return visit in 9 months if needed, but if continues stable can discuss following up with PCP for continued prescription refills. Labs (CMP) in 1 year from start date.     Total time spent 33 min today.             Again, thank you for allowing me to participate in the care of your patient.        Sincerely,        Meliza Garcia MD

## 2024-01-29 NOTE — PROGRESS NOTES
Virtual Visit Details    Type of service:  Video Visit   Video Start Time: 11:45 AM  Video End Time:12:08 PM    Originating Location (pt. Location): Home    Distant Location (provider location):  On-site  Platform used for Video Visit: [employer]    He unfortunately had an ongoing ear infection for about 1 month and is seeing ENT. The nose rubbing has improved tremendously. Who he is has come back. He did have some breakthrough pain and then his mother increased to 75mg twice daily.     We discussed that this is common in patients with migraine, usually mediated by systemic factors. She can consider reducing his dose to 50mg twice daily after the ear infection is adequately treated.     He was on 25mg twice daily and then stopped at 50mg twice topiramate. He is completely off oxcarbazepine.     We will have a return visit in 9 months if needed, but if continues stable can discuss following up with PCP for continued prescription refills. Labs (CMP) in 1 year from start date.     Total time spent 33 min today.

## 2024-01-29 NOTE — NURSING NOTE
Is the patient currently in the state of [place]? Is the patient currently in the state of [place]? YES    Visit mode:VIDEO    If the visit is dropped, the patient can be reconnected by: VIDEO VISIT: Text to cell phone:   Telephone Information:   Mobile [phone]       Will anyone else be joining the visit? NO  (If patient encounters technical issues they should call [phone])    How would you like to obtain your AVS? MyChart    Are changes needed to the allergy or medication list? No    Reason for visit: Follow Up    Shawna JEWELL

## 2024-03-05 ENCOUNTER — MYC MEDICAL ADVICE (OUTPATIENT)
Dept: NEUROLOGY | Facility: CLINIC | Age: 29
End: 2024-03-05
Payer: COMMERCIAL

## 2024-03-05 DIAGNOSIS — G44.001 INTRACTABLE CLUSTER HEADACHE SYNDROME, UNSPECIFIED CHRONICITY PATTERN: ICD-10-CM

## 2024-03-05 RX ORDER — TOPIRAMATE SPINKLE 25 MG/1
75 CAPSULE ORAL 2 TIMES DAILY
Qty: 180 CAPSULE | Refills: 3 | Status: SHIPPED | OUTPATIENT
Start: 2024-03-05 | End: 2024-07-01

## 2024-03-05 NOTE — TELEPHONE ENCOUNTER
Per 2/13/24 myChart encounter pt experienced headache when taking topiramate 75 mg in morning and 50 mg in evening, so Dr. Garcia recommended increasing again.      Medication T'd up for topiramate 75 mg TID.     Last filled by neurosurgery Cyndi Borges CNP.    Please advise.     Ayala JIM RN, BSN  [facility]

## 2024-07-01 DIAGNOSIS — G44.001 INTRACTABLE CLUSTER HEADACHE SYNDROME, UNSPECIFIED CHRONICITY PATTERN: ICD-10-CM

## 2024-07-01 DIAGNOSIS — G43.009 MIGRAINE WITHOUT AURA AND WITHOUT STATUS MIGRAINOSUS, NOT INTRACTABLE: Primary | ICD-10-CM

## 2024-07-01 RX ORDER — TOPIRAMATE SPINKLE 25 MG/1
75 CAPSULE ORAL 2 TIMES DAILY
Qty: 180 CAPSULE | Refills: 3 | Status: SHIPPED | OUTPATIENT
Start: 2024-07-01

## 2024-10-27 ENCOUNTER — HEALTH MAINTENANCE LETTER (OUTPATIENT)
Age: 29
End: 2024-10-27

## 2024-10-28 DIAGNOSIS — G44.001 INTRACTABLE CLUSTER HEADACHE SYNDROME, UNSPECIFIED CHRONICITY PATTERN: ICD-10-CM

## 2024-10-29 RX ORDER — TOPIRAMATE SPINKLE 25 MG/1
75 CAPSULE ORAL 2 TIMES DAILY
Qty: 180 CAPSULE | Refills: 3 | Status: SHIPPED | OUTPATIENT
Start: 2024-10-29

## 2024-10-29 NOTE — TELEPHONE ENCOUNTER
Refill request for the following medication (s) listed below.    Pending Prescriptions:                       Disp   Refills    topiramate (TOPAMAX) 25 MG capsule [Pharm*180 ca*3            Sig: TAKE 3 CAPSULES (75 MG) BY MOUTH 2 TIMES DAILY      Last office visit provider:  1/29/24  Next appointment scheduled: unscheduled. Pt due for fu      Medication T'd for review and signature  Memo DURANT ATC on 10/29/2024 at 8:30 AM

## 2025-01-09 ASSESSMENT — MIGRAINE DISABILITY ASSESSMENT (MIDAS)
HOW MANY DAYS DID YOU NOT DO HOUSEWORK BECAUSE OF HEADACHES: 0
HOW MANY DAYS IN THE PAST 3 MONTHS HAVE YOU HAD A HEADACHE: 0
HOW MANY DAYS WAS YOUR PRODUCTIVITY CUT IN HALF BECAUSE OF HEADACHES: 0
TOTAL SCORE: 0
HOW MANY DAYS DID YOU MISS WORK OR SCHOOL BECAUSE OF HEADACHES: 0
HOW OFTEN WERE SOCIAL ACTIVITIES MISSED DUE TO HEADACHES: 0
HOW MANY DAYS WAS HOUSEWORK PRODUCTIVITY CUT IN HALF DUE TO HEADACHES: 0

## 2025-01-09 ASSESSMENT — HEADACHE IMPACT TEST (HIT 6)
WHEN YOU HAVE HEADACHES HOW OFTEN IS THE PAIN SEVERE: ALWAYS
HIT6 TOTAL SCORE: 60
HOW OFTEN HAVE YOU FELT TOO TIRED TO WORK BECAUSE OF YOUR HEADACHES: RARELY
WHEN YOU HAVE A HEADACHE HOW OFTEN DO YOU WISH YOU COULD LIE DOWN: VERY OFTEN
HOW OFTEN HAVE YOU FELT FED UP OR IRRITATED BECAUSE OF YOUR HEADACHES: RARELY
HOW OFTEN DO HEADACHES LIMIT YOUR DAILY ACTIVITIES: SOMETIMES
HOW OFTEN DID HEADACHS LIMIT CONCENTRATION ON WORK OR DAILY ACTIVITY: SOMETIMES

## 2025-01-13 ENCOUNTER — OFFICE VISIT (OUTPATIENT)
Dept: NEUROLOGY | Facility: CLINIC | Age: 30
End: 2025-01-13
Payer: COMMERCIAL

## 2025-01-13 VITALS — HEART RATE: 65 BPM | SYSTOLIC BLOOD PRESSURE: 133 MMHG | DIASTOLIC BLOOD PRESSURE: 96 MMHG

## 2025-01-13 DIAGNOSIS — G43.009 MIGRAINE WITHOUT AURA AND WITHOUT STATUS MIGRAINOSUS, NOT INTRACTABLE: Primary | ICD-10-CM

## 2025-01-13 PROCEDURE — 99213 OFFICE O/P EST LOW 20 MIN: CPT | Performed by: PSYCHIATRY & NEUROLOGY

## 2025-01-13 PROCEDURE — G2211 COMPLEX E/M VISIT ADD ON: HCPCS | Performed by: PSYCHIATRY & NEUROLOGY

## 2025-01-13 NOTE — PROGRESS NOTES
[facility]    Headache Neurology Progress Note  January 13, 2025    Assessment/Plan:   Carter Guaman is a 29 year old male who presents with migraine well controlled on current therapy. Recommended to continue on the following:    Topiramate 75mg twice daily, consider weaning at the next visit. Will follow up labs.     Follow up with PCP for evaluation of the new rash.     Will see him in return in 6 months.    The longitudinal plan of care for Carter was addressed during this visit. Due to the added complexity in care, I will continue to support Carter in the subsequent management of this condition(s) and with the ongoing continuity of care of this condition(s).    Total time spent was 27 minutes today.    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  He has not had any more pain behaviors in the past month. He has had a rash, limited to the right lower extremity and buttock, now stable and not progressing. This does not appear from the image on his mother's phone to be consistent with Eliecer-Diallo syndrome.    Objective:    Vitals: BP (!) 133/96 (BP Location: Right arm, Patient Position: Sitting, Cuff Size: Adult Regular)   Pulse 65   General: Cooperative, NAD  Neurologic:  Mental Status: Fully alert, attentive.  Cranial Nerves: Facial movements symmetric.   Motor: No abnormal movements.    Light touch on the three cranial nerve distributions elicited no pain behaviors.   Motor strength intact without drift in uppers and able to fully squat and sustain in the lowers.    Pertinent Investigations:          1/9/2025     8:38 AM   HIT-6   When you have headaches, how often is the pain severe 13   How often do headaches limit your ability to do usual daily activities including household work, work, school, or social activities? 10   When you have a headache, how often do you wish you could lie down? 11   In the  past 4 weeks, how often have you felt too tired to do work or daily activities because of your headaches 8   In the past 4 weeks, how often have you felt fed up or irritated because of your headaches 8   In the past 4 weeks, how often did headaches limit your ability to concentrate on work or daily activities 10   HIT-6 Total Score 60        Patient-reported           1/9/2025     8:43 AM   MIDAS - in the past three months:   On how many days did you miss work or school because of your headaches? 0   How many days was your productivity at work or school reduced by half or more because of your headaches? 0   On how many days did you not do household work because of your headaches? 0   How many days was your productivity in household work reduced by half or more because of your headaches? 0   On how many days did you miss family, social, or leisure activities because of your headaches? 0   On how many days did you have a headache? 0   MIDAS Score 0 (I - Little or No Disability)

## 2025-01-13 NOTE — LETTER
1/13/2025      Carter Guaman  [address] [address] [facility] [id number]      Dear Colleague,    Thank you for referring your patient, Carter Guaman, to the [facility]. Please see a copy of my visit note below.    [facility]    Headache Neurology Progress Note  January 13, 2025    Assessment/Plan:   Carter Guaman is a 29 year old male who presents with migraine well controlled on current therapy. Recommended to continue on the following:    Topiramate 75mg twice daily, consider weaning at the next visit. Will follow up labs.     Follow up with PCP for evaluation of the new rash.     Will see him in return in 6 months.    The longitudinal plan of care for Carter was addressed during this visit. Due to the added complexity in care, I will continue to support Carter in the subsequent management of this condition(s) and with the ongoing continuity of care of this condition(s).    Total time spent was 27 minutes today.    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  He has not had any more pain behaviors in the past month. He has had a rash, limited to the right lower extremity and buttock, now stable and not progressing. This does not appear from the image on his mother's phone to be consistent with Eliecer-Diallo syndrome.    Objective:    Vitals: BP (!) 133/96 (BP Location: Right arm, Patient Position: Sitting, Cuff Size: Adult Regular)   Pulse 65   General: Cooperative, NAD  Neurologic:  Mental Status: Fully alert, attentive.  Cranial Nerves: Facial movements symmetric.   Motor: No abnormal movements.    Light touch on the three cranial nerve distributions elicited no pain behaviors.   Motor strength intact without drift in uppers and able to fully squat and sustain in the lowers.    Pertinent Investigations:          1/9/2025     8:38 AM   HIT-6   When you have headaches, how  often is the pain severe 13   How often do headaches limit your ability to do usual daily activities including household work, work, school, or social activities? 10   When you have a headache, how often do you wish you could lie down? 11   In the past 4 weeks, how often have you felt too tired to do work or daily activities because of your headaches 8   In the past 4 weeks, how often have you felt fed up or irritated because of your headaches 8   In the past 4 weeks, how often did headaches limit your ability to concentrate on work or daily activities 10   HIT-6 Total Score 60        Patient-reported           1/9/2025     8:43 AM   MIDAS - in the past three months:   On how many days did you miss work or school because of your headaches? 0   How many days was your productivity at work or school reduced by half or more because of your headaches? 0   On how many days did you not do household work because of your headaches? 0   How many days was your productivity in household work reduced by half or more because of your headaches? 0   On how many days did you miss family, social, or leisure activities because of your headaches? 0   On how many days did you have a headache? 0   MIDAS Score 0 (I - Little or No Disability)           Again, thank you for allowing me to participate in the care of your patient.        Sincerely,        Meliza Garcia MD    Electronically signed

## 2025-02-27 DIAGNOSIS — G44.001 INTRACTABLE CLUSTER HEADACHE SYNDROME, UNSPECIFIED CHRONICITY PATTERN: ICD-10-CM

## 2025-02-27 RX ORDER — TOPIRAMATE SPINKLE 25 MG/1
75 CAPSULE ORAL 2 TIMES DAILY
Qty: 270 CAPSULE | Refills: 3 | Status: SHIPPED | OUTPATIENT
Start: 2025-02-27

## 2025-02-27 NOTE — TELEPHONE ENCOUNTER
Pending Prescriptions:                       Disp   Refills    topiramate (TOPAMAX) 25 MG capsule        270 ca*3            Sig: Take 3 capsules (75 mg) by mouth 2 times daily.    Prescription t'd and sent to Dr. Garcia for signature.    Fina Gonzales RN, BSN  [facility]

## 2025-02-27 NOTE — TELEPHONE ENCOUNTER
[facility] Call Center    Phone Message    May a detailed message be left on voicemail: no     Reason for Call: Medication Refill Request    Has the patient contacted the pharmacy for the refill? Yes     Name of medication being requested:   topiramate (TOPAMAX) 25 MG capsule     Provider who prescribed the medication: Meliza Garcia    Pharmacy:   [facility]  [address] [place] [address]  [phone]    Date medication is needed: 02/28/2025    Action Taken: Other: Neurology    Travel Screening: Not Applicable

## 2025-05-25 ENCOUNTER — HEALTH MAINTENANCE LETTER (OUTPATIENT)
Age: 30
End: 2025-05-25

## 2025-08-26 DIAGNOSIS — G44.001 INTRACTABLE CLUSTER HEADACHE SYNDROME, UNSPECIFIED CHRONICITY PATTERN: ICD-10-CM

## 2025-08-26 RX ORDER — TOPIRAMATE SPINKLE 25 MG/1
CAPSULE ORAL
Qty: 270 CAPSULE | Refills: 3 | Status: SHIPPED | OUTPATIENT
Start: 2025-08-26